# Patient Record
Sex: MALE | Race: ASIAN | NOT HISPANIC OR LATINO | URBAN - METROPOLITAN AREA
[De-identification: names, ages, dates, MRNs, and addresses within clinical notes are randomized per-mention and may not be internally consistent; named-entity substitution may affect disease eponyms.]

---

## 2017-10-06 ENCOUNTER — EMERGENCY (EMERGENCY)
Facility: HOSPITAL | Age: 27
LOS: 1 days | Discharge: HOME | End: 2017-10-06
Admitting: EMERGENCY MEDICINE
Payer: OTHER MISCELLANEOUS

## 2017-10-06 VITALS
HEART RATE: 71 BPM | RESPIRATION RATE: 16 BRPM | TEMPERATURE: 99 F | DIASTOLIC BLOOD PRESSURE: 95 MMHG | SYSTOLIC BLOOD PRESSURE: 144 MMHG | OXYGEN SATURATION: 99 %

## 2017-10-06 PROCEDURE — 99284 EMERGENCY DEPT VISIT MOD MDM: CPT

## 2017-10-06 NOTE — ED ADULT TRIAGE NOTE - CHIEF COMPLAINT QUOTE
Pt is employee who was putting IV in a pt when pt jumped and dirty needle stuck him in the left pointer finger.

## 2017-10-07 RX ORDER — EMTRICITABINE AND TENOFOVIR DISOPROXIL FUMARATE 200; 300 MG/1; MG/1
1 TABLET, FILM COATED ORAL DAILY
Qty: 0 | Refills: 0 | Status: DISCONTINUED | OUTPATIENT
Start: 2017-10-07 | End: 2017-10-10

## 2017-10-07 RX ORDER — RALTEGRAVIR 400 MG/1
400 TABLET, FILM COATED ORAL ONCE
Qty: 0 | Refills: 0 | Status: COMPLETED | OUTPATIENT
Start: 2017-10-07 | End: 2017-10-07

## 2017-10-07 RX ADMIN — RALTEGRAVIR 400 MILLIGRAM(S): 400 TABLET, FILM COATED ORAL at 00:15

## 2017-10-07 RX ADMIN — EMTRICITABINE AND TENOFOVIR DISOPROXIL FUMARATE 1 TABLET(S): 200; 300 TABLET, FILM COATED ORAL at 00:15

## 2017-10-07 NOTE — ED PROVIDER NOTE - PHYSICAL EXAMINATION
+ needle puncture wound noted to the left 4th digit. with no erythema, no swelling, no bleeding, no drainage, no TTP.

## 2017-10-07 NOTE — ED PROVIDER NOTE - MEDICAL DECISION MAKING DETAILS
27 yo male with needle stick injury. Tetanus and vaccines uptodate.  PEP offered and accepted. Pt given dose in ED and handed a 10 day prescription, with instructions about taking given with voice back understanding. Pt aware he must follow up with employee health in 2-3 days for results and to get the rest of the medication for a total of 28 day course. Source pt being tested anonymously.

## 2017-10-07 NOTE — ED PROVIDER NOTE - OBJECTIVE STATEMENT
27 yo male with no sig PMHx presents to the ED s/p needle stick from a 20 gauge IV needle to the left 4th digit. Pt was trying to get an IV into a source pt, the pt moved suddenly when he was about to retract the needle and he got stuck. Pt did not see any huy blood on the needle as, it was not in source patients vein  during attempt, however pt finger was slightly bleeding after incident Denies erythema, numbness, tingling, weakness, swelling, pain, fever, nausea, vomiting. Pt is up to date of his tetanus. Source pt being tested, anonymously.

## 2017-10-07 NOTE — ED PROVIDER NOTE - CARE PLAN
Principal Discharge DX:	Needlestick injury accident  Instructions for follow-up, activity and diet:	Follow up with [facility] in 2-3 days call [phone] for appointment.  Take Truvada 1 tablet once a day and Take Isentress 1 tablet twice a day ( every 12 hours). You were given a 10 day supply. The completion is given by employee health.   Clean affected area with soap and water.  Return to the ER for any persistent/worsening or new symptoms fevers, chills, redness, weakness, numbness or any concerning symptoms.

## 2017-10-07 NOTE — ED PROVIDER NOTE - PLAN OF CARE
Follow up with [facility] in 2-3 days call [phone] for appointment.  Take Truvada 1 tablet once a day and Take Isentress 1 tablet twice a day ( every 12 hours). You were given a 10 day supply. The completion is given by employee health.   Clean affected area with soap and water.  Return to the ER for any persistent/worsening or new symptoms fevers, chills, redness, weakness, numbness or any concerning symptoms.

## 2022-05-03 NOTE — ED PROVIDER NOTE - TEMPLATE, MLM
LVM to schedule botox follow up at [facility]. Left Call Center # for scheduling. Must be 12 weeks from last injection 5/2/2022.    Jeronimo Chairez, EMT       Wounds General

## 2025-07-22 NOTE — ED ADULT TRIAGE NOTE - PAIN: PRESENCE, MLM
PROGRESS NOTE    Subjective     Daija is a 53 year old here for Office Visit, Physical, and Other (Patient stopped taking sertraline about 3 months on her own . States doing well without medication )     The patient is a 53-year-old female here for her annual exam. She has a past medical history of hyperlipidemia and partial thyroidectomy. She had self-discontinued her sertraline and feels like she is doing well off of it.    She has been monitoring her blood pressure at home, which has been fluctuating, sometimes reaching the upper 90s and in the 140's consistently. She notes that her blood pressure tends to be slightly elevated in the morning, regardless of whether she consumes coffee or not. She used to check her blood pressure five times a day but has since reduced it to once a day. Most of her readings have been in the 140s or higher. She believes her weight gain may be contributing to her high blood pressure. She is currently taking semaglutide 2 mg, which she started in 05/2025 and is on her second refill. She is under the care of a cardiologist, Dr. Hill, who prescribes her cholesterol medication. Her last appointment with him was in 2024, and she plans to see him again in the fall. Her blood pressure was in the upper 130s during her last visit with him.    She had shingles last year and is interested in getting the shingles vaccine. She is also considering the pneumonia vaccine. Her colonoscopy has been rescheduled for 08/2025. She plans to see a gynecologist for her Pap smear and recently had a mammogram. She regularly sees an ophthalmologist and dentist.    Marital Status:   Diet: She maintains a healthy diet and avoids salty foods and processed foods.  Alcohol: She consumes alcohol only on weekends. About 1-2 drinks.   Tobacco: She does not use tobacco.  Living Condition: She lives with her  and feels safe at home and in her relationship.    She exercises regularly, including brisk  walking or jogging on a treadmill 4 to 5 days a week and strength training with weights. Her bowel movements are regular, usually occurring every morning after coffee. She reports no abdominal pain.    PAST SURGICAL HISTORY:  - Partial thyroidectomy    Review of Systems  As documented above.    SDOH Never Smoker          Objective   Vitals:    07/23/25 0838 07/23/25 0842 07/23/25 0930   BP: (!) 154/97 (!) 167/97 (!) 152/90  Comment: manual BP   Pulse: 92 85    Temp: 98.1 °F (36.7 °C)     TempSrc: Oral     Weight: 77 kg (169 lb 12.1 oz)     Height: 5' 2.5\" (1.588 m)     PainSc:  0     BMI (Calculated): 30.55       Physical Exam  Constitutional:       Appearance: Normal appearance.   HENT:      Head: Normocephalic and atraumatic.      Right Ear: Tympanic membrane, ear canal and external ear normal.      Left Ear: Tympanic membrane, ear canal and external ear normal.      Nose: Nose normal.      Mouth/Throat:      Mouth: Mucous membranes are moist.      Neck: Neck supple.   Eyes:      Extraocular Movements: Extraocular movements intact.      Conjunctiva/sclera: Conjunctivae normal.      Pupils: Pupils are equal, round, and reactive to light.   Cardiovascular:      Rate and Rhythm: Normal rate and regular rhythm.      Heart sounds: Normal heart sounds. No murmur heard.  Pulmonary:      Effort: Pulmonary effort is normal.      Breath sounds: Normal breath sounds. No wheezing.   Abdominal:      General: Bowel sounds are normal. There is no distension.      Palpations: Abdomen is soft.      Tenderness: There is no abdominal tenderness.   Musculoskeletal:         General: Normal range of motion.      Right lower leg: No edema.      Left lower leg: No edema.   Lymphadenopathy:      Cervical: No cervical adenopathy.   Skin:     General: Skin is warm and dry.   Neurological:      General: No focal deficit present.      Mental Status: She is alert. Mental status is at baseline.   Psychiatric:         Mood and Affect: Mood  normal.              ASSESSMENT AND PLAN        1. Encounter for general adult medical examination with abnormal findings  -     CBC with Automated Differential  -     Comprehensive Metabolic Panel  -     Lipid Panel Without Reflex  -     POCT Urine Dip Auto  -     Thyroid Stimulating Hormone Reflex  -     Glycohemoglobin  - Advised to receive the shingles vaccine at the local pharmacy.  - Pneumonia vaccine discussed and can be administered during a future visit.  - Hepatitis B test will be conducted through blood work.  - Order for a mammogram placed for next year when due.  CERVICAL: Last pap on file 2019, she is planning on seeing her gynecologist  BREAST: mammo ordered, next due 5/2026  COLON: upcoming colonoscopy scheduled for 8/2025  HTN screen: elevated, see below  JOSE F screen: low risk  Tobacco screen (US for AA, LDCT): no tobacco use   STI screen: low risk  BMI: 30, encourage healthy diet and regular exercise  PREVENTATIVE LABS: ordered  VACCINES: declines. Discussed pna vaccine at follow up visit or nurse visit. Shingles vaccine at local pharmacy  PHQ2: neg     2. Primary hypertension  -     irbesartan (AVAPRO) 75 MG tablet; Take 1 tablet by mouth daily.  - Blood pressure readings have been consistently elevated, with most readings in the 140s or higher.  - Monitoring at home shows higher readings in the mornings; weight gain discussed as a potential factor.  - Irbesartan 75 mg will be initiated to manage hypertension.  - Advised to monitor blood pressure regularly and report any side effects such as lightheadedness or dizziness.  -discussed exercise, low na diet  -follow up in 1 month     3. Obesity (BMI 30-39.9)  -discussed, diet, exercise and weight loss  -currently taking compounded semaglutide and following with a weight loss clinic   4. Mixed anxiety and depressive disorder  -tapered off of sertraline, feel like doing okay off of medication   5. History of partial thyroidectomy  -will check thyroid  levels as above   6. Need for hepatitis B screening test  -     Hepatitis B Surface Antibody, Quantitative  7. Encounter for screening mammogram for malignant neoplasm of breast  -     MAMMO SCREENING BILATERAL W BOBBY; Future    Follow-up: A follow-up visit is scheduled in 1 month to monitor her blood pressure and assess the effectiveness of the new medication.        Return in about 4 weeks (around 8/20/2025) for hypertension.         denies pain/discomfort